# Patient Record
Sex: MALE | Race: WHITE | NOT HISPANIC OR LATINO | Employment: OTHER | URBAN - METROPOLITAN AREA
[De-identification: names, ages, dates, MRNs, and addresses within clinical notes are randomized per-mention and may not be internally consistent; named-entity substitution may affect disease eponyms.]

---

## 2017-03-28 ENCOUNTER — HOSPITAL ENCOUNTER (EMERGENCY)
Facility: HOSPITAL | Age: 67
Discharge: HOME | End: 2017-03-28
Attending: EMERGENCY MEDICINE | Admitting: EMERGENCY MEDICINE

## 2017-03-28 ENCOUNTER — APPOINTMENT (OUTPATIENT)
Dept: GENERAL RADIOLOGY | Facility: HOSPITAL | Age: 67
End: 2017-03-28

## 2017-03-28 VITALS
BODY MASS INDEX: 34.36 KG/M2 | SYSTOLIC BLOOD PRESSURE: 123 MMHG | DIASTOLIC BLOOD PRESSURE: 70 MMHG | WEIGHT: 240 LBS | HEIGHT: 70 IN | HEART RATE: 90 BPM | OXYGEN SATURATION: 94 % | TEMPERATURE: 97.7 F | RESPIRATION RATE: 18 BRPM

## 2017-03-28 DIAGNOSIS — M54.32 SCIATICA OF LEFT SIDE: Primary | ICD-10-CM

## 2017-03-28 DIAGNOSIS — R19.7 DIARRHEA, UNSPECIFIED TYPE: ICD-10-CM

## 2017-03-28 PROCEDURE — 72110 X-RAY EXAM L-2 SPINE 4/>VWS: CPT

## 2017-03-28 PROCEDURE — 99283 EMERGENCY DEPT VISIT LOW MDM: CPT

## 2017-03-28 PROCEDURE — 72170 X-RAY EXAM OF PELVIS: CPT

## 2017-03-28 RX ORDER — CARVEDILOL 25 MG/1
25 TABLET ORAL 2 TIMES DAILY WITH MEALS
COMMUNITY

## 2017-03-28 RX ORDER — ATORVASTATIN CALCIUM 80 MG/1
80 TABLET, FILM COATED ORAL DAILY
COMMUNITY

## 2017-03-28 RX ORDER — PRIMIDONE 50 MG/1
50 TABLET ORAL 2 TIMES DAILY
COMMUNITY

## 2017-03-28 RX ORDER — FUROSEMIDE 40 MG/1
40 TABLET ORAL DAILY
COMMUNITY

## 2017-03-28 NOTE — ED PROVIDER NOTES
EMERGENCY DEPARTMENT ENCOUNTER    CHIEF COMPLAINT  Chief Complaint: Back pain  History given by: Pt, spouse, Daughter  History limited by: N/A  Room Number: 31/31  PMD: No Known Provider      HPI:  Pt is a 67 y.o. male who presents complaining of intermittent L lower back pain around his L buttocks over the past month, which has been worsening over the past 2 weeks. The pain occasionally worsens with movement, but improves with lying still. He is not in pain currently. This pain comes and goes. Pt's spouse notes the pt was unable to stand, because of the pain in left buttocks, after falling asleep on the commode two nights ago. He also c/o intermittent diarrhea over the past month, but reports normal bowel movement this morning. Pt denies fever, weakness, incontinence. He has not seen his PCP regarding the issue. He reports a history of upper back pain w/ DJD in the past, but denies prior episode of lower back pain.    Duration: One month  Onset: Gradual  Timing: Intermittent  Location: L buttocks  Radiation: None  Quality: Aching  Intensity/Severity: Moderate  Progression: Resolved currently  Associated Symptoms: Intermittent diarrhea  Aggravating Factors: Movement  Alleviating Factors: Lying still  Previous Episodes: No  Treatment before arrival: None specified    PAST MEDICAL HISTORY  Active Ambulatory Problems     Diagnosis Date Noted   • No Active Ambulatory Problems     Resolved Ambulatory Problems     Diagnosis Date Noted   • No Resolved Ambulatory Problems     Past Medical History:   Diagnosis Date   • Coronary artery disease    • Diabetes mellitus    • Hypertension        PAST SURGICAL HISTORY  Past Surgical History:   Procedure Laterality Date   • APPENDECTOMY     • COLONOSCOPY N/A 4/25/2016    Procedure: COLONOSCOPY WITH COLD SNARE POLYPECTOMY;  Surgeon: Farzad Hooper MD;  Location: [facility] ENDOSCOPY;  Service:    • CORONARY ARTERY BYPASS GRAFT  2012    X4 stents   • JOINT REPLACEMENT     • KNEE  SURGERY Right     ligaments repaired   • TONSILLECTOMY      as child       FAMILY HISTORY  History reviewed. No pertinent family history.    SOCIAL HISTORY  Social History     Social History   • Marital status:      Spouse name: N/A   • Number of children: N/A   • Years of education: N/A     Occupational History   • Not on file.     Social History Main Topics   • Smoking status: Former Smoker   • Smokeless tobacco: Not on file      Comment: quit 20 yrs ago   • Alcohol use Yes      Comment: holidays only   • Drug use: No   • Sexual activity: Not on file     Other Topics Concern   • Not on file     Social History Narrative       ALLERGIES  Review of patient's allergies indicates no known allergies.    REVIEW OF SYSTEMS  Review of Systems   Constitutional: Negative for chills and fever.   HENT: Negative for sore throat and trouble swallowing.    Eyes: Negative for visual disturbance.   Respiratory: Negative for cough and shortness of breath.    Cardiovascular: Negative for chest pain and leg swelling.   Gastrointestinal: Positive for diarrhea (intermittent for one month). Negative for abdominal pain and vomiting.   Endocrine: Negative.    Genitourinary: Negative for decreased urine volume and frequency.   Musculoskeletal: Positive for back pain (L buttocks). Negative for neck pain.   Skin: Negative for rash.   Allergic/Immunologic: Negative.    Neurological: Negative for weakness and numbness.   Hematological: Negative.    Psychiatric/Behavioral: Negative.    All other systems reviewed and are negative.      PHYSICAL EXAM  ED Triage Vitals   Temp Heart Rate Resp BP SpO2   03/28/17 0953 03/28/17 0953 03/28/17 0953 03/28/17 1009 03/28/17 0953   97.5 °F (36.4 °C) 90 18 119/76 96 %      Temp src Heart Rate Source Patient Position BP Location FiO2 (%)   03/28/17 0953 03/28/17 0953 03/28/17 1009 03/28/17 1009 --   Tympanic Monitor Lying Right arm        Physical Exam   Constitutional: He is oriented to person, place,  and time and well-developed, well-nourished, and in no distress.   HENT:   Head: Normocephalic and atraumatic.   Eyes: EOM are normal. Pupils are equal, round, and reactive to light.   Neck: Normal range of motion. Neck supple.   Cardiovascular: Normal rate, regular rhythm, normal heart sounds and intact distal pulses.    Pulmonary/Chest: Effort normal and breath sounds normal. No respiratory distress.   Abdominal: Soft. There is no tenderness. There is no rebound and no guarding.   Genitourinary:   Genitourinary Comments: Normal rectal tone   Musculoskeletal: Normal range of motion. He exhibits no edema.   There is no pain with flexion or extension of his legs. Extensor hallucis longus is intact.   Neurological: He is alert and oriented to person, place, and time. He has normal sensation and normal strength. He has a normal Straight Leg Raise Test.   Normal saddle sensation. He is able to stand on his toes and has a normal gait without pain.   Skin: Skin is warm and dry.   Psychiatric: Mood and affect normal.   Nursing note and vitals reviewed.    RADIOLOGY  XR Spine Lumbar 4+ View   Final Result      XR Pelvis 1 or 2 View   Final Result      XR L-Spine and Pelvis:   There is normal alignment. There is moderate disc space narrowing at L1-L2 and L3-L4 and L5-S1. Mild disc space narrows present at L4-L5. All of the vertebral bodies are normal in height. There is no evidence of recent or old fracture or subluxation. No lytic or blastic lesions are identified. A single AP view of the pelvis demonstrates no bony or articular  abnormalities. There is no evidence of recent or old fracture. The hip joints are unremarkable.    I ordered the above noted radiological studies. Interpreted by radiologist. Discussed with radiologist. Reviewed by me in PACS.       PROCEDURES  Procedures      PROGRESS AND CONSULTS  ED Course   10:34 AM:  Vitals: BP: 111/68 HR: 90 Temp: 97.5 °F (36.4 °C) (Tympanic) O2 sat: 94%  D/w pt that I  believe his pain is sciatic in nature due to his history and exam. Discussed that emergently, treatment options are limited and he will need to f/u with his PCP for further management if his pain persists. Advised the pt to take Tylenol and Motrin at home to control his symptoms. Will order XR L-Spine and Pelvis for further evaluation. Pt understands and agrees with the plan, all questions answered.    12:49 PM:  Vitals: BP: 138/76 HR: 80 Temp: 97.5 °F (36.4 °C) (Tympanic) O2 sat: 97%  Rechecked pt. Pt is resting comfortably and has not experienced further back pain while here. He has experienced an episode of diarrhea while here. Discussed results of imaging, which showed degenerative changes but no fx, and the option for labs for further evaluation of his diarrhea and IVF for hydration. Pt strongly prefers to be discharged. Pt understands and agrees with the plan, all questions answered.    MEDICAL DECISION MAKING  Results were reviewed/discussed with the patient and they were also made aware of online access. Pt also made aware that some labs, such as cultures, will not be resulted during ER visit and follow up with PMD is necessary.     MDM  Number of Diagnoses or Management Options  Diarrhea, unspecified type:   Sciatica of left side:      Amount and/or Complexity of Data Reviewed  Tests in the radiology section of CPT®: reviewed and ordered (XR L-Spine and Pelvis: shows no fx)  Discussion of test results with the performing providers: yes  Independent visualization of images, tracings, or specimens: yes    Patient Progress  Patient progress: stable         DIAGNOSIS  Final diagnoses:   Sciatica of left side   Diarrhea, unspecified type       DISPOSITION  DISCHARGE    Patient discharged in stable condition.    Reviewed implications of results, diagnosis, meds, responsibility to follow up, warning signs and symptoms of possible worsening, potential complications and reasons to return to ER, including any new or  worsening symptoms.    Patient/Family voiced understanding of above instructions.    Discussed plan for discharge, as there is no emergent indication for admission.  Pt/family is agreeable and understands need for follow up and repeat testing.  Pt is aware that discharge does not mean that nothing is wrong but it indicates no emergency is present that requires admission and they must continue care with follow-up as given below or physician of their choice.     FOLLOW-UP  No Known Provider  [facility] [id number]  [phone]      Follow Up with DR Lowry in [place] in next 1-2 days. , Return if pain worsens, If symptoms worsen, shortness of breath, fever, weakness in extremities, incontinance, any concerns         Medication List      Notice     No changes were made to your prescriptions during this visit.          Latest Documented Vital Signs:  As of 4:04 PM  BP- 123/70 HR- 90 Temp- 97.7 °F (36.5 °C) (Tympanic) O2 sat- 94%    --  Documentation assistance provided by brandon Josue for Dr. Lucas.  Information recorded by the scribe was done at my direction and has been verified and validated by me.     Mik Josue  03/28/17 1301       Rl Lucas MD  03/28/17 6436

## 2017-07-09 ENCOUNTER — HOSPITAL ENCOUNTER (EMERGENCY)
Facility: HOSPITAL | Age: 67
Discharge: HOME | End: 2017-07-09
Attending: EMERGENCY MEDICINE | Admitting: EMERGENCY MEDICINE

## 2017-07-09 VITALS
DIASTOLIC BLOOD PRESSURE: 88 MMHG | HEART RATE: 76 BPM | HEIGHT: 69 IN | OXYGEN SATURATION: 97 % | TEMPERATURE: 98.9 F | WEIGHT: 245 LBS | SYSTOLIC BLOOD PRESSURE: 154 MMHG | RESPIRATION RATE: 16 BRPM | BODY MASS INDEX: 36.29 KG/M2

## 2017-07-09 DIAGNOSIS — T25.229A: Primary | ICD-10-CM

## 2017-07-09 LAB
ALBUMIN SERPL-MCNC: 4.2 G/DL (ref 3.5–5.2)
ALBUMIN/GLOB SERPL: 1.6 G/DL
ALP SERPL-CCNC: 116 U/L (ref 39–117)
ALT SERPL W P-5'-P-CCNC: 6 U/L (ref 1–41)
ANION GAP SERPL CALCULATED.3IONS-SCNC: 13.4 MMOL/L
AST SERPL-CCNC: 14 U/L (ref 1–40)
BASOPHILS # BLD AUTO: 0.01 10*3/MM3 (ref 0–0.2)
BASOPHILS NFR BLD AUTO: 0.2 % (ref 0–1.5)
BILIRUB SERPL-MCNC: 0.8 MG/DL (ref 0.1–1.2)
BUN BLD-MCNC: 20 MG/DL (ref 8–23)
BUN/CREAT SERPL: 21.5 (ref 7–25)
CALCIUM SPEC-SCNC: 9.1 MG/DL (ref 8.6–10.5)
CHLORIDE SERPL-SCNC: 98 MMOL/L (ref 98–107)
CO2 SERPL-SCNC: 27.6 MMOL/L (ref 22–29)
CREAT BLD-MCNC: 0.93 MG/DL (ref 0.76–1.27)
DEPRECATED RDW RBC AUTO: 43.6 FL (ref 37–54)
EOSINOPHIL # BLD AUTO: 0.15 10*3/MM3 (ref 0–0.7)
EOSINOPHIL NFR BLD AUTO: 2.5 % (ref 0.3–6.2)
ERYTHROCYTE [DISTWIDTH] IN BLOOD BY AUTOMATED COUNT: 13.4 % (ref 11.5–14.5)
GFR SERPL CREATININE-BSD FRML MDRD: 81 ML/MIN/1.73
GLOBULIN UR ELPH-MCNC: 2.6 GM/DL
GLUCOSE BLD-MCNC: 157 MG/DL (ref 65–99)
HCT VFR BLD AUTO: 41.2 % (ref 40.4–52.2)
HGB BLD-MCNC: 14.3 G/DL (ref 13.7–17.6)
IMM GRANULOCYTES # BLD: 0 10*3/MM3 (ref 0–0.03)
IMM GRANULOCYTES NFR BLD: 0 % (ref 0–0.5)
LYMPHOCYTES # BLD AUTO: 0.83 10*3/MM3 (ref 0.9–4.8)
LYMPHOCYTES NFR BLD AUTO: 13.7 % (ref 19.6–45.3)
MCH RBC QN AUTO: 31.5 PG (ref 27–32.7)
MCHC RBC AUTO-ENTMCNC: 34.7 G/DL (ref 32.6–36.4)
MCV RBC AUTO: 90.7 FL (ref 79.8–96.2)
MONOCYTES # BLD AUTO: 0.36 10*3/MM3 (ref 0.2–1.2)
MONOCYTES NFR BLD AUTO: 6 % (ref 5–12)
NEUTROPHILS # BLD AUTO: 4.69 10*3/MM3 (ref 1.9–8.1)
NEUTROPHILS NFR BLD AUTO: 77.6 % (ref 42.7–76)
PLATELET # BLD AUTO: 141 10*3/MM3 (ref 140–500)
PMV BLD AUTO: 9.9 FL (ref 6–12)
POTASSIUM BLD-SCNC: 4.3 MMOL/L (ref 3.5–5.2)
PROT SERPL-MCNC: 6.8 G/DL (ref 6–8.5)
RBC # BLD AUTO: 4.54 10*6/MM3 (ref 4.6–6)
SODIUM BLD-SCNC: 139 MMOL/L (ref 136–145)
WBC NRBC COR # BLD: 6.04 10*3/MM3 (ref 4.5–10.7)

## 2017-07-09 PROCEDURE — 99284 EMERGENCY DEPT VISIT MOD MDM: CPT

## 2017-07-09 PROCEDURE — 36415 COLL VENOUS BLD VENIPUNCTURE: CPT | Performed by: PHYSICIAN ASSISTANT

## 2017-07-09 PROCEDURE — 85025 COMPLETE CBC W/AUTO DIFF WBC: CPT | Performed by: PHYSICIAN ASSISTANT

## 2017-07-09 PROCEDURE — 80053 COMPREHEN METABOLIC PANEL: CPT | Performed by: PHYSICIAN ASSISTANT

## 2017-07-09 RX ORDER — CEPHALEXIN 500 MG/1
500 CAPSULE ORAL 2 TIMES DAILY
Qty: 14 CAPSULE | Refills: 0 | Status: SHIPPED | OUTPATIENT
Start: 2017-07-09

## 2017-07-09 RX ORDER — SULFAMETHOXAZOLE AND TRIMETHOPRIM 400; 80 MG/1; MG/1
1 TABLET ORAL ONCE
Status: COMPLETED | OUTPATIENT
Start: 2017-07-09 | End: 2017-07-09

## 2017-07-09 RX ORDER — SULFAMETHOXAZOLE AND TRIMETHOPRIM 800; 160 MG/1; MG/1
1 TABLET ORAL 2 TIMES DAILY
Qty: 14 TABLET | Refills: 0 | Status: SHIPPED | OUTPATIENT
Start: 2017-07-09

## 2017-07-09 RX ORDER — CEPHALEXIN 500 MG/1
500 CAPSULE ORAL ONCE
Status: COMPLETED | OUTPATIENT
Start: 2017-07-09 | End: 2017-07-09

## 2017-07-09 RX ADMIN — SULFAMETHOXAZOLE AND TRIMETHOPRIM 1 TABLET: 400; 80 TABLET ORAL at 19:40

## 2017-07-09 RX ADMIN — CEPHALEXIN 500 MG: 500 CAPSULE ORAL at 19:40
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
## 2017-07-09 NOTE — DISCHARGE INSTRUCTIONS
Rest  Wet to dry dressings twice a day  Keep site clean and dry  Wash with mild soap and water three times a day  Keep covered at work  Follow up with PMD for wound and blood pressure recheck  Return to ER with pain, swelling, numbness/tingling, fever, chills, weakness, redness, red streaking, drainage, bleeding, decreased sensation, worsening of symptoms or any other concerns.

## 2017-07-09 NOTE — ED NOTES
Nitza Leahy evaluated pt. At bedside. Discussed treatment plan.     Rodrick Machuca RN  07/09/17 3496
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
## 2017-07-13 ENCOUNTER — APPOINTMENT (OUTPATIENT)
Dept: WOUND CARE | Facility: HOSPITAL | Age: 67
End: 2017-07-13
Attending: SURGERY

## 2017-07-20 ENCOUNTER — APPOINTMENT (OUTPATIENT)
Dept: WOUND CARE | Facility: HOSPITAL | Age: 67
End: 2017-07-20
Attending: SURGERY

## 2017-07-20 PROCEDURE — G0463 HOSPITAL OUTPT CLINIC VISIT: HCPCS

## 2017-08-03 ENCOUNTER — APPOINTMENT (OUTPATIENT)
Dept: WOUND CARE | Facility: HOSPITAL | Age: 67
End: 2017-08-03
Attending: SURGERY

## 2017-08-03 PROCEDURE — G0463 HOSPITAL OUTPT CLINIC VISIT: HCPCS

## 2021-03-15 ENCOUNTER — BULK ORDERING (OUTPATIENT)
Dept: CASE MANAGEMENT | Facility: OTHER | Age: 71
End: 2021-03-15

## 2021-03-15 DIAGNOSIS — Z23 IMMUNIZATION DUE: ICD-10-CM
